# Patient Record
Sex: FEMALE | Race: WHITE | NOT HISPANIC OR LATINO | Employment: OTHER | URBAN - METROPOLITAN AREA
[De-identification: names, ages, dates, MRNs, and addresses within clinical notes are randomized per-mention and may not be internally consistent; named-entity substitution may affect disease eponyms.]

---

## 2017-04-10 ENCOUNTER — OFFICE VISIT (OUTPATIENT)
Dept: OBSTETRICS AND GYNECOLOGY | Facility: CLINIC | Age: 57
End: 2017-04-10

## 2017-04-10 VITALS
WEIGHT: 129 LBS | BODY MASS INDEX: 21.49 KG/M2 | DIASTOLIC BLOOD PRESSURE: 78 MMHG | HEIGHT: 65 IN | SYSTOLIC BLOOD PRESSURE: 118 MMHG

## 2017-04-10 DIAGNOSIS — Z13.9 SCREENING: ICD-10-CM

## 2017-04-10 DIAGNOSIS — Z01.419 WELL FEMALE EXAM WITH ROUTINE GYNECOLOGICAL EXAM: Primary | ICD-10-CM

## 2017-04-10 DIAGNOSIS — Z12.4 PAP SMEAR FOR CERVICAL CANCER SCREENING: ICD-10-CM

## 2017-04-10 DIAGNOSIS — F41.1 GENERALIZED ANXIETY DISORDER: ICD-10-CM

## 2017-04-10 LAB
BILIRUB BLD-MCNC: NEGATIVE MG/DL
CLARITY, POC: CLEAR
COLOR UR: YELLOW
GLUCOSE UR STRIP-MCNC: NEGATIVE MG/DL
KETONES UR QL: NEGATIVE
LEUKOCYTE EST, POC: ABNORMAL
NITRITE UR-MCNC: NEGATIVE MG/ML
PH UR: 7 [PH] (ref 5–8)
PROT UR STRIP-MCNC: NEGATIVE MG/DL
RBC # UR STRIP: NEGATIVE /UL
SP GR UR: 1.01 (ref 1–1.03)
UROBILINOGEN UR QL: NORMAL

## 2017-04-10 PROCEDURE — 99386 PREV VISIT NEW AGE 40-64: CPT | Performed by: OBSTETRICS & GYNECOLOGY

## 2017-04-10 PROCEDURE — 81002 URINALYSIS NONAUTO W/O SCOPE: CPT | Performed by: OBSTETRICS & GYNECOLOGY

## 2017-04-10 RX ORDER — GLUCOSAMINE HCL 500 MG
TABLET ORAL
COMMUNITY

## 2017-04-10 RX ORDER — PANTOPRAZOLE SODIUM 40 MG/1
TABLET, DELAYED RELEASE ORAL
COMMUNITY
Start: 2017-04-03

## 2017-04-10 RX ORDER — MULTIVIT WITH MINERALS/LUTEIN
500 TABLET ORAL
COMMUNITY

## 2017-04-10 RX ORDER — DIAZEPAM 5 MG/1
TABLET ORAL
COMMUNITY
Start: 2017-03-09 | End: 2018-05-14

## 2017-04-10 NOTE — PROGRESS NOTES
[facility]  Routine Annual Visit    4/10/2017    Patient: Maura Srinivasan          MR#:[id number]      History of Present Illness    56 y.o. female  who presents for annual exam.    Patient presenting for annual exam stating it's been sometime since she's had a normal check.  The patient has a complaint of a black spot on her vagina    No LMP recorded (approximate). Patient is postmenopausal.  Obstetric History:  OB History      Para Term  AB TAB SAB Ectopic Multiple Living    4 2 2  2  2   2         Menstrual History:  Menarche age: 13 years  No LMP recorded (approximate). Patient is postmenopausal.       Sexual History:       ________________________________________  Patient Active Problem List   Diagnosis   • Generalized anxiety disorder       Past Medical History:   Diagnosis Date   • Basal cell carcinoma     ON NECK       Past Surgical History:   Procedure Laterality Date   • DILATATION AND CURETTAGE  06/10/1991   • DILATATION AND CURETTAGE  1996   • SINUS SURGERY     • TONSILLECTOMY      AS A CHILD       History   Smoking Status   • Former Smoker   • Quit date:    Smokeless Tobacco   • Never Used       Family History   Problem Relation Age of Onset   • Lung cancer Father 59   • Melanoma Mother 38   • Thyroid cancer Sister 50   • Prostate cancer Paternal Grandfather 62   • Cervical cancer Paternal Grandmother 76   • Stomach cancer Maternal Grandmother 76   • Melanoma Other 18       Prior to Admission medications    Medication Sig Start Date End Date Taking? Authorizing Provider   Cholecalciferol (VITAMIN D3) 3000 UNITS tablet Take  by mouth.   Yes Historical Provider, MD   diazePAM (VALIUM) 5 MG tablet TAKE 1/2 TO 1 TABLET BY MOUTH EVERY 6 HOURS AS NEEDED *MUST LAST 30 DAYS* 3/9/17  Yes Historical Provider, MD   pantoprazole (PROTONIX) 40 MG EC tablet TAKE 1 TABLET BY MOUTH DAILY FOR 30 DAYS 4/3/17  Yes Historical Provider, MD   vitamin C (ASCORBIC ACID) 250  "MG tablet Take 500 mg by mouth.   Yes Historical Provider, MD     ________________________________________    Current contraception: post menopausal status  History of abnormal Pap smear: no  Family history of uterine or ovarian cancer: no  Family History of colon cancer/colon polyps: no  History of abnormal mammogram: no  History of abnormal lipids: no    The following portions of the patient's history were reviewed and updated as appropriate: allergies, current medications, past family history, past medical history, past social history, past surgical history and problem list.    Review of Systems   Constitutional: Negative.    HENT: Positive for sinus pressure.    Respiratory: Negative.    Cardiovascular: Positive for palpitations.   Gastrointestinal: Positive for abdominal distention and constipation.   Genitourinary: Negative.         Black spots on vulva    Psychiatric/Behavioral: Negative.         Anxiety         Pertinent items are noted in HPI.     Objective   Physical Exam   Genitourinary:             /78  Ht 65\" (165.1 cm)  Wt 129 lb (58.5 kg)  LMP  (Approximate)  BMI 21.47 kg/m2   BP Readings from Last 3 Encounters:   04/10/17 118/78      Wt Readings from Last 3 Encounters:   04/10/17 129 lb (58.5 kg)        BMI: Estimated body mass index is 21.47 kg/(m^2) as calculated from the following:    Height as of this encounter: 65\" (165.1 cm).    Weight as of this encounter: 129 lb (58.5 kg).      General:   alert, appears stated age and cooperative   Heart: regular rate and rhythm, S1, S2 normal, no murmur, click, rub or gallop   Lungs: clear to auscultation bilaterally   Abdomen: soft, non-tender, without masses or organomegaly   Breast: inspection negative, no nipple discharge or bleeding, no masses or nodularity palpable   Vulva: normal, see notes above, moderate atrophy   Vagina: normal mucosa   Cervix: no lesions   Uterus: normal size   Adnexa: normal adnexa     Assessment:    1.  Normal annual " exam   2.  Chronic generalized anxiety stable  3.  Moderate vulvar vaginal atrophy Rx Premarin cream  4.  Benign hemangiomas of the vulva    Plan:    [x]  Rx: premarin cream   [x]  Mammogram review:  8.10.16  [x]  PAP done  []  Occult fecal blood test (Insure)  []  Labs:   [x]  GC/Chl/TV  []  DEXA scan   [x]  Colonoscopy: repeat scheduled in May per pt           Didier Krishnamurthy MD  4/10/2017 1:38 PM

## 2017-04-13 ENCOUNTER — TELEPHONE (OUTPATIENT)
Dept: OBSTETRICS AND GYNECOLOGY | Facility: CLINIC | Age: 57
End: 2017-04-13

## 2017-04-13 LAB
CYTOLOGIST CVX/VAG CYTO: NORMAL
CYTOLOGY CVX/VAG DOC THIN PREP: NORMAL
DX ICD CODE: NORMAL
HIV 1 & 2 AB SER-IMP: NORMAL
HPV I/H RISK 4 DNA CVX QL PROBE+SIG AMP: NEGATIVE
OTHER STN SPEC: NORMAL
PATH REPORT.FINAL DX SPEC: NORMAL
STAT OF ADQ CVX/VAG CYTO-IMP: NORMAL

## 2017-04-13 NOTE — TELEPHONE ENCOUNTER
----- Message from Didier Krishnamurthy MD sent at 4/13/2017  7:21 AM EDT -----  Call pt:  PAP is negative.

## 2017-10-11 ENCOUNTER — TRANSCRIBE ORDERS (OUTPATIENT)
Dept: ADMINISTRATIVE | Facility: HOSPITAL | Age: 57
End: 2017-10-11

## 2017-10-11 DIAGNOSIS — Z12.31 VISIT FOR SCREENING MAMMOGRAM: Primary | ICD-10-CM

## 2017-10-19 ENCOUNTER — HOSPITAL ENCOUNTER (OUTPATIENT)
Dept: MAMMOGRAPHY | Facility: HOSPITAL | Age: 57
Discharge: HOME | End: 2017-10-19
Attending: OBSTETRICS & GYNECOLOGY | Admitting: OBSTETRICS & GYNECOLOGY

## 2017-10-19 ENCOUNTER — TELEPHONE (OUTPATIENT)
Dept: OBSTETRICS AND GYNECOLOGY | Facility: CLINIC | Age: 57
End: 2017-10-19

## 2017-10-19 DIAGNOSIS — Z12.31 VISIT FOR SCREENING MAMMOGRAM: ICD-10-CM

## 2017-10-19 PROCEDURE — G0202 SCR MAMMO BI INCL CAD: HCPCS

## 2017-10-19 NOTE — TELEPHONE ENCOUNTER
----- Message from Didier Krishnamurthy MD sent at 10/19/2017  2:03 PM EDT -----  Call patient: Normal mammogram

## 2017-10-20 ENCOUNTER — TELEPHONE (OUTPATIENT)
Dept: OBSTETRICS AND GYNECOLOGY | Facility: CLINIC | Age: 57
End: 2017-10-20

## 2017-10-23 ENCOUNTER — TELEPHONE (OUTPATIENT)
Dept: OBSTETRICS AND GYNECOLOGY | Facility: CLINIC | Age: 57
End: 2017-10-23

## 2017-10-23 NOTE — TELEPHONE ENCOUNTER
Telephone     10/20/2017  [facility]    Columba Blake MA   Family Medicine    Telephone Encounter      Left roddy to return call      Telephone Encounter      ----- Message from Didier Krishnamurthy MD sent at 10/19/2017  2:03 PM EDT -----  Call patient: Normal mammogram

## 2018-05-10 ENCOUNTER — TRANSCRIBE ORDERS (OUTPATIENT)
Dept: ADMINISTRATIVE | Facility: HOSPITAL | Age: 58
End: 2018-05-10

## 2018-05-10 DIAGNOSIS — Z13.820 SCREENING FOR OSTEOPOROSIS: Primary | ICD-10-CM

## 2018-05-14 ENCOUNTER — OFFICE VISIT (OUTPATIENT)
Dept: OBSTETRICS AND GYNECOLOGY | Age: 58
End: 2018-05-14

## 2018-05-14 VITALS
DIASTOLIC BLOOD PRESSURE: 58 MMHG | SYSTOLIC BLOOD PRESSURE: 90 MMHG | BODY MASS INDEX: 19.83 KG/M2 | WEIGHT: 119 LBS | HEIGHT: 65 IN

## 2018-05-14 DIAGNOSIS — Z01.419 WELL FEMALE EXAM WITH ROUTINE GYNECOLOGICAL EXAM: Primary | ICD-10-CM

## 2018-05-14 DIAGNOSIS — F41.1 GENERALIZED ANXIETY DISORDER: ICD-10-CM

## 2018-05-14 DIAGNOSIS — Z13.89 SCREENING FOR BLOOD OR PROTEIN IN URINE: ICD-10-CM

## 2018-05-14 DIAGNOSIS — Z12.4 PAP SMEAR FOR CERVICAL CANCER SCREENING: ICD-10-CM

## 2018-05-14 DIAGNOSIS — R30.0 DYSURIA: ICD-10-CM

## 2018-05-14 PROBLEM — R22.2 CHEST WALL MASS: Status: ACTIVE | Noted: 2018-05-06

## 2018-05-14 PROBLEM — E78.00 PURE HYPERCHOLESTEROLEMIA WITH TARGET LOW DENSITY LIPOPROTEIN (LDL) CHOLESTEROL LESS THAN 130 MG/DL: Status: ACTIVE | Noted: 2018-01-08

## 2018-05-14 LAB
BILIRUB BLD-MCNC: NEGATIVE MG/DL
CLARITY, POC: CLEAR
COLOR UR: YELLOW
GLUCOSE UR STRIP-MCNC: NEGATIVE MG/DL
KETONES UR QL: NEGATIVE
LEUKOCYTE EST, POC: NEGATIVE
NITRITE UR-MCNC: NEGATIVE MG/ML
PH UR: 7.5 [PH] (ref 5–8)
PROT UR STRIP-MCNC: NEGATIVE MG/DL
RBC # UR STRIP: NEGATIVE /UL
SP GR UR: 1.01 (ref 1–1.03)
UROBILINOGEN UR QL: NORMAL

## 2018-05-14 PROCEDURE — 81002 URINALYSIS NONAUTO W/O SCOPE: CPT | Performed by: OBSTETRICS & GYNECOLOGY

## 2018-05-14 PROCEDURE — 99396 PREV VISIT EST AGE 40-64: CPT | Performed by: OBSTETRICS & GYNECOLOGY

## 2018-05-14 RX ORDER — FLUOCINOLONE ACETONIDE 0.11 MG/ML
2 OIL AURICULAR (OTIC)
COMMUNITY
Start: 2018-01-08

## 2018-05-14 RX ORDER — PROPRANOLOL HYDROCHLORIDE 10 MG/1
TABLET ORAL
COMMUNITY
Start: 2018-02-10

## 2018-05-14 RX ORDER — CLONAZEPAM 0.5 MG/1
TABLET ORAL
Refills: 0 | COMMUNITY
Start: 2018-04-30

## 2018-05-14 RX ORDER — LEVOMEFOLATE MAGNESIUM, LEUCOVORIN, FOLIC ACID, FERROUS CYSTEINE GLYCINATE, MAGNESIUM ASCORBATE, ZINC ASCORBATE, COCARBOXYLASE, FLAVIN ADENINE DINUCLEOTIDE, NADH, PYRIDOXAL PHOSPHATE ANHYDROUS, COBAMAMIDE, BETAINE, MAGNESIUM L-THREONATE, 1,2-DOCOSAHEXANOYL-SN-GLYCERO-3-PHOSPHOSERINE CALCIUM, 1,2-ICOSAPENTOYL-SN-GLYCERO-3-PHOSPHOSERINE CALCIUM, AND PHOSPHATIDYL SERINE 5.23; 2.5; 1; 13.6; 24; 1; 25; 25; 25; 25; 50; 500; 1; 6.4; 800; 12 MG/1; MG/1; MG/1; MG/1; MG/1; MG/1; UG/1; UG/1; UG/1; UG/1; UG/1; UG/1; MG/1; MG/1; UG/1; MG/1
CAPSULE, DELAYED RELEASE PELLETS ORAL
Refills: 3 | COMMUNITY
Start: 2018-04-16

## 2018-05-14 RX ORDER — DESVENLAFAXINE SUCCINATE 50 MG/1
TABLET, EXTENDED RELEASE ORAL
COMMUNITY
Start: 2017-12-12

## 2018-05-14 NOTE — PROGRESS NOTES
Routine Annual Visit    2018    Patient: Maura Srinivasan          MR#:[id number]      History of Present Illness    Chief Complaint   Patient presents with   • Gynecologic Exam     annual ck up last pap /last mammo 10/17       57 y.o. female  who presents for annual exam.    Patient presents essentially without complaints.  She was previously been diagnosed with a few foci of hemangioma on her vulva and she requests those be checked today.  The patient's also reporting mild cystitis for the past week or so.  She states that her symptoms are mild to moderate and intermittent for the last week and slightly worse with voiding.  She is chronic diarrhea and feels like this may be a nidus for bacterial infection of her bladder    No LMP recorded. Patient is postmenopausal.  Obstetric History:  OB History      Para Term  AB Living    4 2 2  2 2    SAB TAB Ectopic Molar Multiple Live Births    2     2         Menstrual History:     No LMP recorded. Patient is postmenopausal.       Sexual History:       ________________________________________  Patient Active Problem List   Diagnosis   • Generalized anxiety disorder   • Well female exam with routine gynecological exam   • Pure hypercholesterolemia with target low density lipoprotein (LDL) cholesterol less than 130 mg/dL   • Chest wall mass       Past Medical History:   Diagnosis Date   • Basal cell carcinoma     ON NECK   • Helicobacter positive gastritis    • MTHFR (methylene THF reductase) deficiency and homocystinuria    • Small intestinal bacterial overgrowth        Past Surgical History:   Procedure Laterality Date   • DILATATION AND CURETTAGE  06/10/1991   • DILATATION AND CURETTAGE  1996   • DILATATION AND CURETTAGE     • SINUS SURGERY     • TONSILLECTOMY      AS A CHILD       History   Smoking Status   • Former Smoker   • Quit date:    Smokeless Tobacco   • Never Used       Family History   Problem  Relation Age of Onset   • Lung cancer Father 59   • Melanoma Mother 38   • Thyroid cancer Sister 50   • Prostate cancer Paternal Grandfather 62   • Colon cancer Paternal Grandfather 63   • Cervical cancer Paternal Grandmother 76   • Uterine cancer Paternal Grandmother 70   • Stomach cancer Maternal Grandmother 76   • Melanoma Other 18   • Other Son      mthfr   • Other Daughter      mthfr   • Breast cancer Paternal Aunt 68   • Ovarian cancer Neg Hx    • Deep vein thrombosis Neg Hx    • Pulmonary embolism Neg Hx        Prior to Admission medications    Medication Sig Start Date End Date Taking? Authorizing Provider   Cholecalciferol (VITAMIN D3) 3000 UNITS tablet Take  by mouth.   Yes Historical Provider, MD   clonazePAM (KlonoPIN) 0.5 MG tablet TK ONE T PO BID 4/30/18  Yes Historical Provider, MD   desvenlafaxine (PRISTIQ) 50 MG 24 hr tablet TK 1 T PO  ONCE A DAY 12/12/17  Yes Historical Provider, MD   fluocinolone acetonide (DERMOTIC) 0.01 % oil otic oil Administer 2 drops into ears. 1/8/18  Yes Historical Provider, MD   MAGNESIUM PO Take  by mouth.   Yes Historical Provider, MD   NON FORMULARY Estriol-estradiol 50:50 progesterone I click daily 6out of 7 days   Yes Historical Provider, MD   NON FORMULARY Testosterone hrt  4mg/ml cream I click daily   Yes Historical Provider, MD   pantoprazole (PROTONIX) 40 MG EC tablet TAKE 1 TABLET BY MOUTH DAILY FOR 30 DAYS 4/3/17  Yes Historical Provider, MD   Prenat Vit-Fe Gly Cys-FA-Omega (ENBRACE HR) capsule TK ONE C PO QD 4/16/18  Yes Historical Provider, MD   propranolol (INDERAL) 10 MG tablet TK 1 T PO BID 2/10/18  Yes Historical Provider, MD   vitamin C (ASCORBIC ACID) 250 MG tablet Take 500 mg by mouth.   Yes Historical Provider, MD   conjugated estrogens (PREMARIN) 0.625 MG/GM vaginal cream Insert  into the vagina Daily. 4/10/17 5/14/18  Didier Krishnamurthy MD   diazePAM (VALIUM) 5 MG tablet TAKE 1/2 TO 1 TABLET BY MOUTH EVERY 6 HOURS AS NEEDED *MUST LAST 30 DAYS* 3/9/17  "5/14/18  Historical Provider, MD     ________________________________________    Current contraception: post menopausal status  History of abnormal Pap smear: no  Family history of uterine or ovarian cancer: no  Family History of colon cancer/colon polyps: no  History of abnormal mammogram: no  History of abnormal lipids: yes - No treatment as this time    The following portions of the patient's history were reviewed and updated as appropriate: allergies, current medications, past family history, past medical history, past social history, past surgical history and problem list.    Review of Systems    Pertinent items are noted in HPI.     Objective   Physical Exam    BP 90/58   Ht 165.1 cm (65\")   Wt 54 kg (119 lb)   Breastfeeding? No   BMI 19.80 kg/m²    BP Readings from Last 3 Encounters:   05/14/18 90/58   04/10/17 118/78      Wt Readings from Last 3 Encounters:   05/14/18 54 kg (119 lb)   04/10/17 58.5 kg (129 lb)        BMI: Estimated body mass index is 19.8 kg/m² as calculated from the following:    Height as of this encounter: 165.1 cm (65\").    Weight as of this encounter: 54 kg (119 lb).    General:   alert, appears stated age and cooperative   Heart: regular rate and rhythm, S1, S2 normal, no murmur, click, rub or gallop   Lungs: clear to auscultation bilaterally   Abdomen: soft, non-tender, without masses or organomegaly   Breast: inspection negative, no nipple discharge or bleeding, no masses or nodularity palpable and prominent margin of ribs of medially of the right chest   Vulva: normal, 5-6 foci of small hemangiomas-stable   Vagina: normal mucosa, atrophy   Cervix: no lesions and nulliparous appearance   Uterus: normal size   Adnexa: no mass, fullness, tenderness     As part of wellness and prevention, the following topics were discussed with the patient:     []  Nutrition  [x]  Physical activity/regular exercise   []  Healthy weight  []  Injury prevention  []  Smoking cessation  []  Substance " misuse/abuse  []  Sexual behavior  []  STD prevention  []  Contaception  []  Dental health  []  Mental health  []  Immunization  [x]  Encouraged SBE       Assessment:    Maura was seen today for gynecologic exam.    Diagnoses and all orders for this visit:    Well female exam with routine gynecological exam    Screening for blood or protein in urine  -     POC Urinalysis Dipstick    Pap smear for cervical cancer screening  -     IgP, Aptima HPV - ThinPrep Vial, Cervix    Generalized anxiety disorder    Dysuria  - UA completely clear         Plan:  Return in about 1 year (around 5/14/2019) for Annual GYN exam.      Didier Krishnamurthy MD  5/14/2018 2:02 PM

## 2018-05-17 LAB
CYTOLOGIST CVX/VAG CYTO: NORMAL
CYTOLOGY CVX/VAG DOC THIN PREP: NORMAL
DX ICD CODE: NORMAL
HIV 1 & 2 AB SER-IMP: NORMAL
HPV I/H RISK 4 DNA CVX QL PROBE+SIG AMP: NEGATIVE
Lab: NORMAL
OTHER STN SPEC: NORMAL
PATH REPORT.FINAL DX SPEC: NORMAL
STAT OF ADQ CVX/VAG CYTO-IMP: NORMAL

## 2018-05-18 ENCOUNTER — TELEPHONE (OUTPATIENT)
Dept: OBSTETRICS AND GYNECOLOGY | Age: 58
End: 2018-05-18

## 2018-05-21 ENCOUNTER — TELEPHONE (OUTPATIENT)
Dept: OBSTETRICS AND GYNECOLOGY | Age: 58
End: 2018-05-21

## 2018-05-21 NOTE — TELEPHONE ENCOUNTER
----- Message from Didier Krishnamurthy MD sent at 5/17/2018  2:03 PM EDT -----  Call pt:  PAP is negative.

## 2018-05-24 ENCOUNTER — APPOINTMENT (OUTPATIENT)
Dept: BONE DENSITY | Facility: HOSPITAL | Age: 58
End: 2018-05-24

## 2018-06-07 ENCOUNTER — APPOINTMENT (OUTPATIENT)
Dept: BONE DENSITY | Facility: HOSPITAL | Age: 58
End: 2018-06-07

## 2018-06-14 ENCOUNTER — TRANSCRIBE ORDERS (OUTPATIENT)
Dept: ADMINISTRATIVE | Facility: HOSPITAL | Age: 58
End: 2018-06-14

## 2018-06-14 DIAGNOSIS — N63.10 BREAST MASS, RIGHT: Primary | ICD-10-CM

## 2018-06-19 ENCOUNTER — HOSPITAL ENCOUNTER (OUTPATIENT)
Dept: ULTRASOUND IMAGING | Facility: HOSPITAL | Age: 58
Discharge: HOME | End: 2018-06-19
Attending: SURGERY | Admitting: SURGERY

## 2018-06-19 ENCOUNTER — HOSPITAL ENCOUNTER (OUTPATIENT)
Dept: MAMMOGRAPHY | Facility: HOSPITAL | Age: 58
Discharge: HOME | End: 2018-06-19

## 2018-06-19 DIAGNOSIS — N63.10 BREAST MASS, RIGHT: ICD-10-CM

## 2018-06-19 DIAGNOSIS — IMO0002 MASS: ICD-10-CM

## 2018-06-19 PROCEDURE — 76641 ULTRASOUND BREAST COMPLETE: CPT

## 2018-06-19 PROCEDURE — 77065 DX MAMMO INCL CAD UNI: CPT

## 2018-06-21 ENCOUNTER — HOSPITAL ENCOUNTER (OUTPATIENT)
Dept: BONE DENSITY | Facility: HOSPITAL | Age: 58
Discharge: HOME | End: 2018-06-21
Admitting: INTERNAL MEDICINE

## 2018-06-21 DIAGNOSIS — Z13.820 SCREENING FOR OSTEOPOROSIS: ICD-10-CM

## 2018-06-21 PROCEDURE — 77080 DXA BONE DENSITY AXIAL: CPT

## 2018-10-01 ENCOUNTER — TELEPHONE (OUTPATIENT)
Dept: OBSTETRICS AND GYNECOLOGY | Age: 58
End: 2018-10-01

## 2018-10-01 DIAGNOSIS — Z12.31 SCREENING MAMMOGRAM, ENCOUNTER FOR: Primary | ICD-10-CM

## 2018-10-01 NOTE — TELEPHONE ENCOUNTER
Pt wants an order sent to [facility] (she did not have the fax number) the phone number is [phone].  Is this ok with Dr Krishnamurthy?

## 2018-10-01 NOTE — TELEPHONE ENCOUNTER
Dr. Krishnamurthy, patient is due for bilateral mammogram after 10/19/18.  Can you send order please?

## 2018-10-26 ENCOUNTER — TRANSCRIBE ORDERS (OUTPATIENT)
Dept: ADMINISTRATIVE | Facility: HOSPITAL | Age: 58
End: 2018-10-26

## 2018-10-26 DIAGNOSIS — Z12.31 VISIT FOR SCREENING MAMMOGRAM: Primary | ICD-10-CM

## 2019-01-02 ENCOUNTER — APPOINTMENT (OUTPATIENT)
Dept: MAMMOGRAPHY | Facility: HOSPITAL | Age: 59
End: 2019-01-02
Attending: OBSTETRICS & GYNECOLOGY

## 2022-09-07 ENCOUNTER — TRANSCRIBE ORDERS (OUTPATIENT)
Dept: ADMINISTRATIVE | Facility: HOSPITAL | Age: 62
End: 2022-09-07

## 2022-09-07 DIAGNOSIS — Z12.31 VISIT FOR SCREENING MAMMOGRAM: Primary | ICD-10-CM

## 2022-09-15 ENCOUNTER — HOSPITAL ENCOUNTER (OUTPATIENT)
Dept: MAMMOGRAPHY | Facility: HOSPITAL | Age: 62
Discharge: HOME | End: 2022-09-15
Admitting: INTERNAL MEDICINE

## 2022-09-15 DIAGNOSIS — Z12.31 VISIT FOR SCREENING MAMMOGRAM: ICD-10-CM

## 2022-09-15 PROCEDURE — 77063 BREAST TOMOSYNTHESIS BI: CPT

## 2022-09-15 PROCEDURE — 77067 SCR MAMMO BI INCL CAD: CPT
